# Patient Record
Sex: FEMALE
[De-identification: names, ages, dates, MRNs, and addresses within clinical notes are randomized per-mention and may not be internally consistent; named-entity substitution may affect disease eponyms.]

---

## 2021-05-13 ENCOUNTER — DASHBOARD ENCOUNTERS (OUTPATIENT)
Age: 61
End: 2021-05-13

## 2021-05-13 ENCOUNTER — OFFICE VISIT (OUTPATIENT)
Dept: URBAN - METROPOLITAN AREA CLINIC 17 | Facility: CLINIC | Age: 61
End: 2021-05-13
Payer: MEDICARE

## 2021-05-13 DIAGNOSIS — D50.9 MICROCYTIC ANEMIA: ICD-10-CM

## 2021-05-13 DIAGNOSIS — C50.812 MALIGNANT NEOPLASM OF OVERLAPPING SITES OF LEFT FEMALE BREAST: ICD-10-CM

## 2021-05-13 DIAGNOSIS — G47.30 SLEEP APNEA, UNSPECIFIED TYPE: ICD-10-CM

## 2021-05-13 DIAGNOSIS — D50.8 ACQUIRED IRON DEFICIENCY ANEMIA DUE TO DECREASED ABSORPTION: ICD-10-CM

## 2021-05-13 DIAGNOSIS — C50.912 MALIGNANT NEOPLASM OF LEFT FEMALE BREAST, UNSPECIFIED ESTROGEN RECEPTOR STATUS, UNSPECIFIED SITE OF BREAST: ICD-10-CM

## 2021-05-13 PROBLEM — 372064008: Status: ACTIVE | Noted: 2021-05-13

## 2021-05-13 PROBLEM — 73430006: Status: ACTIVE | Noted: 2021-05-13

## 2021-05-13 PROCEDURE — 99243 OFF/OP CNSLTJ NEW/EST LOW 30: CPT | Performed by: INTERNAL MEDICINE

## 2021-05-13 RX ORDER — HYDRALAZINE HYDROCHLORIDE 50 MG/1
TABLET ORAL
Qty: 90 DELAYED RELEASE TABLET | Refills: 2 | Status: DISCONTINUED | COMMUNITY

## 2021-05-13 RX ORDER — ESCITALOPRAM 20 MG/1
TABLET, FILM COATED ORAL
Qty: 30 UNSPECIFIED | Refills: 0 | Status: DISCONTINUED | COMMUNITY

## 2021-05-13 RX ORDER — FLUTICASONE PROPIONATE AND SALMETEROL 100; 50 UG/1; UG/1
POWDER RESPIRATORY (INHALATION)
Qty: 60 BLISTER | Status: ACTIVE | COMMUNITY

## 2021-05-13 RX ORDER — METOCLOPRAMIDE 10 MG/1
TABLET ORAL
Qty: 90 DELAYED RELEASE TABLET | Refills: 2 | Status: DISCONTINUED | COMMUNITY

## 2021-05-13 RX ORDER — BUPROPION HYDROCHLORIDE 150 MG/1
TABLET, FILM COATED, EXTENDED RELEASE ORAL
Qty: 60 DELAYED RELEASE TABLET | Refills: 2 | Status: ACTIVE | COMMUNITY

## 2021-05-13 RX ORDER — TRAMADOL HYDROCHLORIDE 50 MG/1
(SCHEDULE IV DRUG) TABLET ORAL
Qty: 30 DELAYED RELEASE TABLET | Refills: 0 | Status: ACTIVE | COMMUNITY

## 2021-05-13 RX ORDER — ATORVASTATIN CALCIUM 80 MG/1
TABLET, FILM COATED ORAL
Qty: 90 DELAYED RELEASE TABLET | Refills: 1 | Status: ACTIVE | COMMUNITY

## 2021-05-13 RX ORDER — METOPROLOL TARTRATE 25 MG/1
TABLET, FILM COATED ORAL
Qty: 180 DELAYED RELEASE TABLET | Refills: 0 | Status: ACTIVE | COMMUNITY

## 2021-05-13 RX ORDER — INSULIN GLARGINE 100 [IU]/ML
INJECTION, SOLUTION SUBCUTANEOUS
Qty: 15 MILLILITER | Refills: 0 | Status: ACTIVE | COMMUNITY

## 2021-05-13 RX ORDER — LOSARTAN POTASSIUM 100 MG/1
1 TABLET TABLET ORAL ONCE A DAY
Status: ACTIVE | COMMUNITY

## 2021-05-13 RX ORDER — PANTOPRAZOLE SODIUM 40 MG/1
1 TABLET TABLET, DELAYED RELEASE ORAL ONCE A DAY
Status: ACTIVE | COMMUNITY

## 2021-05-13 RX ORDER — LETROZOLE 2.5 MG/1
1 TABLET TABLET, FILM COATED ORAL ONCE A DAY
Status: ACTIVE | COMMUNITY

## 2021-05-13 RX ORDER — LAMOTRIGINE 25 MG/1
1 TABLET TABLET ORAL
Status: ACTIVE | COMMUNITY

## 2021-05-13 RX ORDER — LATANOPROST 50 UG/ML
SOLUTION OPHTHALMIC
Qty: 5 MILLILITER | Refills: 1 | Status: ACTIVE | COMMUNITY

## 2021-05-13 RX ORDER — AMLODIPINE BESYLATE 10 MG/1
TABLET ORAL
Qty: 90 UNSPECIFIED | Refills: 1 | Status: ACTIVE | COMMUNITY

## 2021-05-13 RX ORDER — GABAPENTIN 400 MG/1
CAPSULE ORAL
Qty: 90 CAP | Refills: 0 | Status: DISCONTINUED | COMMUNITY

## 2021-05-13 RX ORDER — CLOPIDOGREL BISULFATE 75 MG/1
TABLET, FILM COATED ORAL
Qty: 90 UNSPECIFIED | Refills: 1 | Status: ACTIVE | COMMUNITY

## 2021-05-13 RX ORDER — SERTRALINE HYDROCHLORIDE 100 MG/1
TABLET ORAL
Qty: 30 UNSPECIFIED | Refills: 1 | Status: ACTIVE | COMMUNITY

## 2021-05-13 RX ORDER — SODIUM, POTASSIUM,MAG SULFATES 17.5-3.13G
354ML SOLUTION, RECONSTITUTED, ORAL ORAL
Qty: 354 MILLILITER | Refills: 0 | OUTPATIENT
Start: 2021-05-13 | End: 2021-05-14

## 2021-05-13 NOTE — HPI-TODAY'S VISIT:
This carlie 60 yo F referred by Dr. Mary Perry for a microcytic anemia.  A copy of this document will be sent to the referring provider.   CBC March 9, 2021 demonstrated a HGB of 10 and an MCV of 80.  She reports abdominal   discomfort in the pelvic region, described as a cramp, not associated with meals or meals.  The pain is rated a 4.   She denies blood in her stools and black stools.   Bowel movements occur every 1 - 2 days, sometimes with straining.  Stools are hard and sometimes loose.  She has never had a colonoscopy.

## 2021-06-03 PROBLEM — 234349007: Status: ACTIVE | Noted: 2021-05-13

## 2021-06-15 ENCOUNTER — OFFICE VISIT (OUTPATIENT)
Dept: URBAN - METROPOLITAN AREA MEDICAL CENTER 12 | Facility: MEDICAL CENTER | Age: 61
End: 2021-06-15
Payer: MEDICARE

## 2021-06-15 DIAGNOSIS — R10.13 ABDOMINAL DISCOMFORT, EPIGASTRIC: ICD-10-CM

## 2021-06-15 DIAGNOSIS — D50.9 ANEMIA, IRON DEFICIENCY: ICD-10-CM

## 2021-06-15 PROCEDURE — 43239 EGD BIOPSY SINGLE/MULTIPLE: CPT | Performed by: INTERNAL MEDICINE
